# Patient Record
Sex: FEMALE | Race: WHITE | URBAN - METROPOLITAN AREA
[De-identification: names, ages, dates, MRNs, and addresses within clinical notes are randomized per-mention and may not be internally consistent; named-entity substitution may affect disease eponyms.]

---

## 2019-08-19 NOTE — PATIENT DISCUSSION
Recommended vitrectomy surgery for treatment of non-resolving, visually significant vitreomacular traction syndrome.

## 2019-08-19 NOTE — PATIENT DISCUSSION
Based on today's exam, diagnostic studies, and/or review of records, the determination was made for no surgical treatment today.

## 2019-08-19 NOTE — PATIENT DISCUSSION
Patient educated on condition.  recommend we have retina take a look at this.  Pt only in [address] thru Saturday.

## 2019-08-19 NOTE — PATIENT DISCUSSION
The patient has evidence of mild vitreomacular traction. No cystic macular changes are associated at this time so no treatment is necessary. A follow up visit will be scheduled.

## 2019-08-19 NOTE — PROCEDURE NOTE: CLINICAL
PROCEDURE NOTE: Laser for Retinal Tear OD. Diagnosis: Retinoschisis with Inner Layer Holes. Prior to laser, risks/benefits/alternatives to laser discussed including loss of vision, decreased peripheral and night vision, need for more laser and/or surgery and patient wished to proceed. A written consent is on file, and the need for today’s laser was discussed and the patient is understanding and wishes to proceed. Laser Lens: SuperQuad. Wavelength: Argon Green. Spot size: 200 um. Pulse power: 440 mW. Number of pulses: 35. Patient tolerated procedure well. There were no complications. Post-op instructions given. Patient given office phone number/answering service number and advised to call immediately should there be loss of vision or pain, or should they have any other questions or concerns. Lynda Darby

## 2019-08-19 NOTE — PATIENT DISCUSSION
Discussed risks of intravitreal Jetrea including serious infection, loss Based on today’s exam, diagnostic studies, and review of records, and the patient’s functional difficulty which appear to be a result of the Vitreomacular Traction (VMT) Syndrome, the DETERMINATION WAS MADE FOR [address]. Discussed benefits, alternatives, and risks of Duayne Flies including (but not limited to) infection, bleeding, retinal detachment, glaucoma, double vision, optic neuropathy, loss of vision, blindness, and loss of eye. Patient was told the vision may not return to the same level as prior to development of the VMT but should improve if the injection is successful and the anatomy returns to a more normal contour. No prediction of the level of visual improvement and/or the degree of distortion reduction can be given.

## 2019-08-19 NOTE — PATIENT DISCUSSION
I used a plastic eye model to explain the process of vitreous syneresis (shrinkage) and detachment as a normal ageing process. In some patients who have abnormal areas of vitreous adhesion this process of vitreous detachment may result in symptoms. Some patients are bothered by annoying floaters in their vision whereas others have more serious problems due to incomplete vitreous separation from the macula with continued traction. This may also involve epiretinal membrane formation which can distort vision. Others may suffer a retinal tear or detachment.

## 2019-08-19 NOTE — PATIENT DISCUSSION
Recommend CATARACT SURGERY to improve view of the retinal before repair of the tractional retinal detachment and to see if cataract surgery improves vision enough to eliminate the need for repair. Patient understands it is likely a vitrectomy will be needed after the cataract surgery to further improve their vision. They understand the cataract surgery will improve the view of the tractional retinal detachment during the surgery, and will improve the view and ability to treat any retinal problems after the surgery.

## 2019-08-23 NOTE — PROCEDURE NOTE: CLINICAL
PROCEDURE NOTE: Laser for Retinal Tear OD. Diagnosis: Retinoschisis with Inner Layer Holes. Anesthesia: Topical. Prior to laser, risks/benefits/alternatives to laser discussed including loss of vision, decreased peripheral and night vision, need for more laser and/or surgery and patient wished to proceed. A written consent is on file, and the need for today’s laser was discussed and the patient is understanding and wishes to proceed. Laser Lens: SuperQuad. Wavelength: Argon Green. Spot size: 200 um. Pulse power: 420 mW. Number of pulses: 30. Patient tolerated procedure well. There were no complications. Post-op instructions given. Patient given office phone number/answering service number and advised to call immediately should there be loss of vision or pain, or should they have any other questions or concerns. Doug Bermudez

## 2020-09-11 ENCOUNTER — IMPORTED ENCOUNTER (OUTPATIENT)
Dept: URBAN - METROPOLITAN AREA CLINIC 50 | Facility: CLINIC | Age: 65
End: 2020-09-11

## 2020-09-11 PROBLEM — Z96.1: Noted: 2020-10-14

## 2020-09-11 PROBLEM — Z96.1: Noted: 2020-10-28

## 2020-09-11 PROBLEM — H11.153: Noted: 2020-10-06

## 2020-09-11 PROBLEM — H04.123: Noted: 2020-09-11

## 2020-09-11 PROBLEM — Z98.890: Noted: 2021-03-23

## 2020-09-11 PROBLEM — H43.813: Noted: 2020-09-11

## 2020-09-11 PROBLEM — H35.373: Noted: 2020-09-11

## 2020-09-11 PROBLEM — H26.492: Noted: 2020-12-03

## 2020-09-11 PROBLEM — Z98.890: Noted: 2021-03-30

## 2020-09-30 ENCOUNTER — IMPORTED ENCOUNTER (OUTPATIENT)
Dept: URBAN - METROPOLITAN AREA CLINIC 50 | Facility: CLINIC | Age: 65
End: 2020-09-30

## 2020-10-06 ENCOUNTER — IMPORTED ENCOUNTER (OUTPATIENT)
Dept: URBAN - METROPOLITAN AREA CLINIC 50 | Facility: CLINIC | Age: 65
End: 2020-10-06

## 2020-10-14 ENCOUNTER — IMPORTED ENCOUNTER (OUTPATIENT)
Dept: URBAN - METROPOLITAN AREA CLINIC 50 | Facility: CLINIC | Age: 65
End: 2020-10-14

## 2020-10-23 ENCOUNTER — IMPORTED ENCOUNTER (OUTPATIENT)
Dept: URBAN - METROPOLITAN AREA CLINIC 50 | Facility: CLINIC | Age: 65
End: 2020-10-23

## 2020-10-28 ENCOUNTER — IMPORTED ENCOUNTER (OUTPATIENT)
Dept: URBAN - METROPOLITAN AREA CLINIC 50 | Facility: CLINIC | Age: 65
End: 2020-10-28

## 2020-10-28 NOTE — PATIENT DISCUSSION
"""S/P IOL OS: Tecnis ZCB00 19.5 +Femto/Arcs +Omidria. Continue post operative instructions and drops per schedule.  """

## 2020-11-05 ENCOUNTER — IMPORTED ENCOUNTER (OUTPATIENT)
Dept: URBAN - METROPOLITAN AREA CLINIC 50 | Facility: CLINIC | Age: 65
End: 2020-11-05

## 2020-11-13 ENCOUNTER — IMPORTED ENCOUNTER (OUTPATIENT)
Dept: URBAN - METROPOLITAN AREA CLINIC 50 | Facility: CLINIC | Age: 65
End: 2020-11-13

## 2020-11-16 ENCOUNTER — IMPORTED ENCOUNTER (OUTPATIENT)
Dept: URBAN - METROPOLITAN AREA CLINIC 50 | Facility: CLINIC | Age: 65
End: 2020-11-16

## 2020-12-03 ENCOUNTER — IMPORTED ENCOUNTER (OUTPATIENT)
Dept: URBAN - METROPOLITAN AREA CLINIC 50 | Facility: CLINIC | Age: 65
End: 2020-12-03

## 2020-12-03 NOTE — PATIENT DISCUSSION
"""S/P IOL OU: OD: Tecsharlene [id number] 21.5 (ORA) @ 176Âº +ORAFemtoOmidria. OS: Tecsharlene ZCB00 19.5/Arcs. "

## 2021-03-23 ENCOUNTER — IMPORTED ENCOUNTER (OUTPATIENT)
Dept: URBAN - METROPOLITAN AREA CLINIC 50 | Facility: CLINIC | Age: 66
End: 2021-03-23

## 2021-03-30 ENCOUNTER — PREPPED CHART (OUTPATIENT)
Dept: URBAN - METROPOLITAN AREA CLINIC 53 | Facility: CLINIC | Age: 66
End: 2021-03-30

## 2021-03-30 ENCOUNTER — IMPORTED ENCOUNTER (OUTPATIENT)
Dept: URBAN - METROPOLITAN AREA CLINIC 50 | Facility: CLINIC | Age: 66
End: 2021-03-30

## 2021-03-30 NOTE — PATIENT DISCUSSION
Discussed dry eye diagnosis with patient. Educated patient on proper lid hygiene and stressed importance of lid massages and the use of warm compresses and artificial tears. Recommend Hydrogel plugs today discussed with patient. Patient would like to proceed, see signed consent. Start Theratears both eyes three times a day .

## 2021-03-30 NOTE — PATIENT DISCUSSION
Monitor ERM for changes. Informed patient of potential for worsening. Instructed patient to call with changes in vision. Recommend regular Amsler grid checks.

## 2021-03-30 NOTE — PATIENT DISCUSSION
Informed patient that their capsular opacification is visually significant and meets the minimum criteria for capsulotomy by YAG laser to increase their vision and decrease their glare symptoms. RBAs of procedure discussed. Recommend YAG Capsulotomy OD today see signed consent. Will follow up in 1-3 weeks for post op visit.

## 2021-03-30 NOTE — PATIENT DISCUSSION
Call if vision decreases or RD warning signs increases/RD warnings given. No signs of retinal tear. Retinal detachment precautions discussed.

## 2021-04-15 ASSESSMENT — VISUAL ACUITY
OD_SC: 20/50
OS_SC: 20/20
OU_CC: J1

## 2021-04-15 ASSESSMENT — TONOMETRY
OS_IOP_MMHG: 10
OD_IOP_MMHG: 12
OD_IOP_MMHG: 10

## 2021-04-17 ASSESSMENT — VISUAL ACUITY
OD_BAT: 20/50
OD_PH: @ 14 IN
OS_BAT: 20/30
OD_SC: 20/30-2
OD_CC: J2@ 14 IN
OS_OTHER: 20/30. 20/60.
OS_OTHER: 20/30. 20/30.
OD_OTHER: 20/40. 20/70.
OS_BAT: 20/30
OS_BAT: 20/30
OS_SC: 20/25
OS_PH: 20/25
OS_BAT: 20/25
OD_SC: 20/50
OS_SC: 20/20
OS_SC: 20/25-1
OS_CC: J2@ 14 IN
OD_SC: 20/50+2
OD_CC: J1@ 17 IN
OD_OTHER: 20/50. 20/200.
OS_CC: 20/100
OS_OTHER: 20/30. 20/60.
OD_BAT: 20/50
OS_PH: @ 14 IN
OS_SC: 20/25-2
OS_SC: 20/30-1
OD_BAT: 20/40
OD_PH: 20/40
OS_SC: 20/20
OD_PH: 20/60-1
OD_SC: 20/40
OD_SC: 20/70-1
OD_PH: 20/30
OD_SC: 20/80+
OD_PH: 20/60-1
OS_SC: 20/20
OD_SC: 20/50
OS_SC: 20/30-1
OS_CC: J1@ 17 IN
OD_SC: 20/100-1
OD_PH: @ 18 IN
OS_OTHER: 20/25. 20/40.
OD_SC: 20/40
OS_PH: 20/25+1
OD_OTHER: 20/50. 20/200.

## 2021-04-17 ASSESSMENT — TONOMETRY
OD_IOP_MMHG: 11
OD_IOP_MMHG: 12
OS_IOP_MMHG: 10
OD_IOP_MMHG: 11
OS_IOP_MMHG: 10
OS_IOP_MMHG: 23
OD_IOP_MMHG: 31
OS_IOP_MMHG: 11
OS_IOP_MMHG: 12
OD_IOP_MMHG: 11
OS_IOP_MMHG: 11
OD_IOP_MMHG: 12
OS_IOP_MMHG: 12
OS_IOP_MMHG: 10
OD_IOP_MMHG: 10
OD_IOP_MMHG: 13
OS_IOP_MMHG: 12
OD_IOP_MMHG: 10
OD_IOP_MMHG: 11
OD_IOP_MMHG: 10
OS_IOP_MMHG: 12

## 2021-04-20 ENCOUNTER — POST-OP CHECK (OUTPATIENT)
Dept: URBAN - METROPOLITAN AREA CLINIC 53 | Facility: CLINIC | Age: 66
End: 2021-04-20

## 2021-04-20 DIAGNOSIS — Z98.890: ICD-10-CM

## 2021-04-20 PROCEDURE — 99024 POSTOP FOLLOW-UP VISIT: CPT

## 2021-04-20 ASSESSMENT — VISUAL ACUITY
OS_SC: 20/25+2
OU_SC: J5
OD_SC: 20/40-1

## 2021-04-20 ASSESSMENT — TONOMETRY
OS_IOP_MMHG: 11
OD_IOP_MMHG: 10